# Patient Record
Sex: FEMALE | NOT HISPANIC OR LATINO | URBAN - METROPOLITAN AREA
[De-identification: names, ages, dates, MRNs, and addresses within clinical notes are randomized per-mention and may not be internally consistent; named-entity substitution may affect disease eponyms.]

---

## 2024-06-19 ENCOUNTER — APPOINTMENT (RX ONLY)
Dept: URBAN - METROPOLITAN AREA CLINIC 132 | Facility: CLINIC | Age: 69
Setting detail: DERMATOLOGY
End: 2024-06-19

## 2024-06-19 DIAGNOSIS — D22 MELANOCYTIC NEVI: ICD-10-CM | Status: STABLE

## 2024-06-19 DIAGNOSIS — L82.1 OTHER SEBORRHEIC KERATOSIS: ICD-10-CM | Status: STABLE

## 2024-06-19 DIAGNOSIS — L82.0 INFLAMED SEBORRHEIC KERATOSIS: ICD-10-CM

## 2024-06-19 DIAGNOSIS — L57.8 OTHER SKIN CHANGES DUE TO CHRONIC EXPOSURE TO NONIONIZING RADIATION: ICD-10-CM

## 2024-06-19 DIAGNOSIS — L81.4 OTHER MELANIN HYPERPIGMENTATION: ICD-10-CM | Status: STABLE

## 2024-06-19 DIAGNOSIS — L738 OTHER SPECIFIED DISEASES OF HAIR AND HAIR FOLLICLES: ICD-10-CM

## 2024-06-19 DIAGNOSIS — D18.0 HEMANGIOMA: ICD-10-CM | Status: STABLE

## 2024-06-19 DIAGNOSIS — L663 OTHER SPECIFIED DISEASES OF HAIR AND HAIR FOLLICLES: ICD-10-CM

## 2024-06-19 DIAGNOSIS — L73.9 FOLLICULAR DISORDER, UNSPECIFIED: ICD-10-CM

## 2024-06-19 PROBLEM — D22.9 MELANOCYTIC NEVI, UNSPECIFIED: Status: ACTIVE | Noted: 2024-06-19

## 2024-06-19 PROBLEM — D18.01 HEMANGIOMA OF SKIN AND SUBCUTANEOUS TISSUE: Status: ACTIVE | Noted: 2024-06-19

## 2024-06-19 PROBLEM — L02.02 FURUNCLE OF FACE: Status: ACTIVE | Noted: 2024-06-19

## 2024-06-19 PROCEDURE — ? PRESCRIPTION

## 2024-06-19 PROCEDURE — 17110 DESTRUCTION B9 LES UP TO 14: CPT

## 2024-06-19 PROCEDURE — ? LIQUID NITROGEN

## 2024-06-19 PROCEDURE — ? COUNSELING

## 2024-06-19 PROCEDURE — ? SUNSCREEN RECOMMENDATIONS

## 2024-06-19 PROCEDURE — 99203 OFFICE O/P NEW LOW 30 MIN: CPT | Mod: 25

## 2024-06-19 PROCEDURE — ? PRESCRIPTION MEDICATION MANAGEMENT

## 2024-06-19 PROCEDURE — ? PHOTO-DOCUMENTATION

## 2024-06-19 RX ORDER — TRIAMCINOLONE ACETONIDE 0.25 MG/G
THIN LAYER CREAM TOPICAL BID
Qty: 15 | Refills: 2 | Status: ERX | COMMUNITY
Start: 2024-06-19

## 2024-06-19 RX ORDER — MUPIROCIN 20 MG/G
THIN LAYER OINTMENT TOPICAL QD
Qty: 22 | Refills: 3 | Status: ERX | COMMUNITY
Start: 2024-06-19

## 2024-06-19 RX ADMIN — TRIAMCINOLONE ACETONIDE THIN LAYER: 0.25 CREAM TOPICAL at 00:00

## 2024-06-19 RX ADMIN — MUPIROCIN THIN LAYER: 20 OINTMENT TOPICAL at 00:00

## 2024-06-19 ASSESSMENT — LOCATION ZONE DERM
LOCATION ZONE: LIP
LOCATION ZONE: LEG

## 2024-06-19 ASSESSMENT — LOCATION SIMPLE DESCRIPTION DERM
LOCATION SIMPLE: RIGHT POSTERIOR THIGH
LOCATION SIMPLE: RIGHT LIP

## 2024-06-19 ASSESSMENT — LOCATION DETAILED DESCRIPTION DERM
LOCATION DETAILED: RIGHT DISTAL LATERAL POSTERIOR THIGH
LOCATION DETAILED: RIGHT UPPER CUTANEOUS LIP

## 2024-06-19 NOTE — PROCEDURE: PRESCRIPTION MEDICATION MANAGEMENT
Render In Strict Bullet Format?: No
Initiate Treatment: Mupirocin mixed with TAC 0.25% bid 14/7\\nEmail office in 2 weeks
Detail Level: Zone

## 2025-06-19 ENCOUNTER — APPOINTMENT (OUTPATIENT)
Dept: URBAN - METROPOLITAN AREA CLINIC 132 | Facility: CLINIC | Age: 70
Setting detail: DERMATOLOGY
End: 2025-06-19

## 2025-06-19 DIAGNOSIS — D18.0 HEMANGIOMA: ICD-10-CM | Status: STABLE

## 2025-06-19 DIAGNOSIS — L57.8 OTHER SKIN CHANGES DUE TO CHRONIC EXPOSURE TO NONIONIZING RADIATION: ICD-10-CM

## 2025-06-19 DIAGNOSIS — L81.4 OTHER MELANIN HYPERPIGMENTATION: ICD-10-CM | Status: STABLE

## 2025-06-19 DIAGNOSIS — L21.8 OTHER SEBORRHEIC DERMATITIS: ICD-10-CM

## 2025-06-19 DIAGNOSIS — L73.9 FOLLICULAR DISORDER, UNSPECIFIED: ICD-10-CM

## 2025-06-19 DIAGNOSIS — D22 MELANOCYTIC NEVI: ICD-10-CM | Status: STABLE

## 2025-06-19 DIAGNOSIS — L82.1 OTHER SEBORRHEIC KERATOSIS: ICD-10-CM | Status: STABLE

## 2025-06-19 PROBLEM — D22.9 MELANOCYTIC NEVI, UNSPECIFIED: Status: ACTIVE | Noted: 2025-06-19

## 2025-06-19 PROBLEM — D18.01 HEMANGIOMA OF SKIN AND SUBCUTANEOUS TISSUE: Status: ACTIVE | Noted: 2025-06-19

## 2025-06-19 PROCEDURE — ?

## 2025-06-19 PROCEDURE — ? SUNSCREEN RECOMMENDATIONS

## 2025-06-19 PROCEDURE — ? COUNSELING

## 2025-06-19 PROCEDURE — ? PRESCRIPTION

## 2025-06-19 PROCEDURE — ? PRESCRIPTION MEDICATION MANAGEMENT

## 2025-06-19 RX ORDER — KETOCONAZOLE 20 MG/ML
SMALL AMOUNT SHAMPOO, SUSPENSION TOPICAL QD
Qty: 120 | Refills: 5 | Status: ERX | COMMUNITY
Start: 2025-06-19

## 2025-06-19 RX ORDER — MUPIROCIN 20 MG/G
THIN LAYER OINTMENT TOPICAL QD
Qty: 22 | Refills: 3 | Status: ERX

## 2025-06-19 RX ORDER — TRIAMCINOLONE ACETONIDE 0.25 MG/G
THIN LAYER CREAM TOPICAL BID
Qty: 15 | Refills: 2 | Status: ERX

## 2025-06-19 RX ADMIN — KETOCONAZOLE SMALL AMOUNT: 20 SHAMPOO, SUSPENSION TOPICAL at 00:00

## 2025-06-19 ASSESSMENT — LOCATION ZONE DERM
LOCATION ZONE: LIP
LOCATION ZONE: SCALP
LOCATION ZONE: FACE
LOCATION ZONE: EAR

## 2025-06-19 ASSESSMENT — LOCATION DETAILED DESCRIPTION DERM
LOCATION DETAILED: RIGHT TRIANGULAR FOSSA
LOCATION DETAILED: LEFT MEDIAL FRONTAL SCALP
LOCATION DETAILED: RIGHT INFERIOR CENTRAL MALAR CHEEK
LOCATION DETAILED: LEFT SUPERIOR CRUS OF ANTIHELIX
LOCATION DETAILED: PHILTRUM
LOCATION DETAILED: LEFT INFERIOR CENTRAL MALAR CHEEK

## 2025-06-19 ASSESSMENT — LOCATION SIMPLE DESCRIPTION DERM
LOCATION SIMPLE: LEFT CHEEK
LOCATION SIMPLE: RIGHT EAR
LOCATION SIMPLE: LEFT SCALP
LOCATION SIMPLE: LEFT EAR
LOCATION SIMPLE: RIGHT CHEEK
LOCATION SIMPLE: UPPER LIP

## 2025-06-19 NOTE — PROCEDURE: PRESCRIPTION MEDICATION MANAGEMENT
Render In Strict Bullet Format?: No
Continue Regimen: Mupirocin mixed with TAC 0.25% bid 14/7
Detail Level: Zone
Initiate Treatment: Keto shampoo to face, ears and scalp 3-4x weekly